# Patient Record
Sex: MALE | Race: WHITE | Employment: FULL TIME | URBAN - METROPOLITAN AREA
[De-identification: names, ages, dates, MRNs, and addresses within clinical notes are randomized per-mention and may not be internally consistent; named-entity substitution may affect disease eponyms.]

---

## 2018-11-07 VITALS
WEIGHT: 235.67 LBS | SYSTOLIC BLOOD PRESSURE: 131 MMHG | RESPIRATION RATE: 17 BRPM | BODY MASS INDEX: 30.25 KG/M2 | TEMPERATURE: 97.8 F | OXYGEN SATURATION: 99 % | DIASTOLIC BLOOD PRESSURE: 85 MMHG | HEIGHT: 74 IN | HEART RATE: 71 BPM

## 2018-11-07 ASSESSMENT — PAIN DESCRIPTION - LOCATION: LOCATION: NECK

## 2018-11-07 ASSESSMENT — PAIN DESCRIPTION - FREQUENCY: FREQUENCY: CONTINUOUS

## 2018-11-07 ASSESSMENT — PAIN DESCRIPTION - ONSET: ONSET: GRADUAL

## 2018-11-07 ASSESSMENT — PAIN DESCRIPTION - DESCRIPTORS: DESCRIPTORS: ACHING

## 2018-11-07 ASSESSMENT — PAIN DESCRIPTION - PAIN TYPE: TYPE: ACUTE PAIN

## 2018-11-07 ASSESSMENT — PAIN DESCRIPTION - PROGRESSION: CLINICAL_PROGRESSION: GRADUALLY WORSENING

## 2018-11-07 ASSESSMENT — PAIN SCALES - GENERAL: PAINLEVEL_OUTOF10: 8

## 2018-11-07 ASSESSMENT — PAIN DESCRIPTION - ORIENTATION: ORIENTATION: RIGHT;LEFT;POSTERIOR

## 2018-11-08 ENCOUNTER — HOSPITAL ENCOUNTER (EMERGENCY)
Age: 58
Discharge: HOME | End: 2018-11-08
Payer: COMMERCIAL

## 2018-11-08 DIAGNOSIS — M54.2 NECK PAIN: Primary | ICD-10-CM

## 2018-11-08 PROCEDURE — 96372 THER/PROPH/DIAG INJ SC/IM: CPT

## 2018-11-08 PROCEDURE — 99282 EMERGENCY DEPT VISIT SF MDM: CPT

## 2018-11-08 PROCEDURE — 6360000002 HC RX W HCPCS: Performed by: NURSE PRACTITIONER

## 2018-11-08 RX ORDER — CYCLOBENZAPRINE HCL 10 MG
10 TABLET ORAL 3 TIMES DAILY PRN
Qty: 20 TABLET | Refills: 0 | Status: SHIPPED | OUTPATIENT
Start: 2018-11-08 | End: 2018-11-15

## 2018-11-08 RX ORDER — KETOROLAC TROMETHAMINE 30 MG/ML
30 INJECTION, SOLUTION INTRAMUSCULAR; INTRAVENOUS ONCE
Status: COMPLETED | OUTPATIENT
Start: 2018-11-08 | End: 2018-11-08

## 2018-11-08 RX ORDER — ORPHENADRINE CITRATE 30 MG/ML
60 INJECTION INTRAMUSCULAR; INTRAVENOUS ONCE
Status: COMPLETED | OUTPATIENT
Start: 2018-11-08 | End: 2018-11-08

## 2018-11-08 RX ORDER — IBUPROFEN 800 MG/1
800 TABLET ORAL EVERY 8 HOURS PRN
Qty: 20 TABLET | Refills: 0 | Status: SHIPPED | OUTPATIENT
Start: 2018-11-08

## 2018-11-08 RX ADMIN — KETOROLAC TROMETHAMINE 30 MG: 30 INJECTION, SOLUTION INTRAMUSCULAR at 00:54

## 2018-11-08 RX ADMIN — ORPHENADRINE CITRATE 60 MG: 30 INJECTION INTRAMUSCULAR; INTRAVENOUS at 00:54

## 2018-11-08 ASSESSMENT — ENCOUNTER SYMPTOMS
BACK PAIN: 0
COLOR CHANGE: 0
SHORTNESS OF BREATH: 0
RESPIRATORY NEGATIVE: 1

## 2018-11-08 NOTE — ED PROVIDER NOTES
**EVALUATED BY ADVANCED PRACTICE PROVIDER**        [facility]  eMERGENCY dEPARTMENT eNCOUnter      Pt Name: Cody Hinton  YME:[id number]  Brodiegfradames 1960  Date of evaluation: 11/7/2018  Provider: RADHA Rivera CNP      Chief Complaint:    Chief Complaint   Patient presents with    Neck Pain     Neck pain x 36 hours. Described as stiffness that started yesterday morning, gradually became worse throughout the day. Advil helped last night, but pain returned this morning and advil has not helped since. Nursing Notes, Past Medical Hx, Past Surgical Hx, Social Hx, Allergies, and Family Hx were all reviewed and agreed with or any disagreements were addressed in the HPI.    HPI:  (Location, Duration, Timing, Severity,Quality, Assoc Sx, Context, Modifying factors)  This is a  59 y.o. male who presents to the emergency department today complaining of neck stiffness. Onset was one 2 days ago. The context was that the symptoms started spontaneously without any known injury or trauma. Pain is been constant. He describes it as stiffness and rated 8/10. Pain is exacerbated with certain movements. No headache lightheadedness or dizziness. No chest pain or shortness of breath. No fever or chills. Patient states he took Motrin which did help the pain but then he went for a jog and flared back up again. PastMedical/Surgical History:  History reviewed. No pertinent past medical history. History reviewed. No pertinent surgical history. Medications:  Previous Medications    No medications on file         Review of Systems:  Review of Systems   Constitutional: Negative for chills, diaphoresis and fever. Respiratory: Negative for shortness of breath. Cardiovascular: Negative for chest pain. Musculoskeletal: Positive for neck pain and neck stiffness. Negative for back pain. Skin: Negative for color change and rash.    Allergic/Immunologic: Negative for Non-plain film images such as CT, Ultrasound and MRI are read by the radiologist. RADHA Chan CNP have directly visualized the radiologic plain film image(s) with the below findings:        Interpretation per the Radiologist below, if available at the time of thisnote:    No orders to display        No results found. MEDICAL DECISION MAKING / ED COURSE:      PROCEDURES:   Procedures    None    Patient was given:  Medications   orphenadrine (NORFLEX) injection 60 mg (60 mg Intramuscular Given 11/8/18 0054)   ketorolac (TORADOL) injection 30 mg (30 mg Intramuscular Given 11/8/18 0054)       Differential Diagnosis: Spinal cord compression, nerve root compression, torticollis cervical discitis, osteomyelitis of the vertebral bodies, carotid dissection, spinal fracture or dislocation, Intracranial Bleed, other. Patient seen and examined today for Neck stiffness. See HPI for patient presentation. Patient is hemodynamically stable, nontoxic, afebrile, and without tachycardia, tachypnea, and hypoxia. Physical exam as well. Well-appearing 68-year-old male lying in bed in no acute distress. Bilateral paracervical tenderness worse on the left. No midline spine tenderness. No carotid bruits. Lungs are CTA bilaterally. Cardiac exam is normal.  Patient is neurovascular intact without deficits. He was given Norflex and Toradol in the emergency department. He was resting comfortably and felt better on my reexam.  I feel he is appropriate and safe for discharge home at this time. The history and physical exam suggests a soft tissue source for pain such as muscles, tendons, nerve irritation, etc. Saroj Wolf's cervical spine was clinically cleared using the NEXUS cervical spine criteria and [place] C-Spine rule. Patient is competent without evidence of intoxication or altered LOC. Patient has no focal neurologic deficits or distracting injury. No midline cervical spine tenderness on palpation.

## 2018-11-08 NOTE — ED PROVIDER NOTES
Negative. Musculoskeletal: Positive for neck pain and neck stiffness. Negative for arthralgias, back pain, gait problem, joint swelling and myalgias. Skin: Negative. Allergic/Immunologic: Negative for immunocompromised state. Neurological: Negative. All other systems reviewed and are negative. Positives and Pertinent negatives as per HPI. Except as noted abovein the ROS, all other systems were reviewed and negative. PAST MEDICAL HISTORY   History reviewed. No pertinent past medical history. SURGICAL HISTORY   History reviewed. No pertinent surgical history. CURRENTMEDICATIONS       Previous Medications    No medications on file         ALLERGIES     Patient has no known allergies. FAMILYHISTORY     History reviewed. No pertinent family history. SOCIAL HISTORY       Social History     Social History    Marital status:      Spouse name: N/A    Number of children: N/A    Years of education: N/A     Social History Main Topics    Smoking status: Never Smoker    Smokeless tobacco: Never Used    Alcohol use Yes      Comment: Occasional    Drug use: No    Sexual activity: Not Asked     Other Topics Concern    None     Social History Narrative    None       SCREENINGS             PHYSICAL EXAM    (up to 7 for level 4, 8 or more for level 5)     ED Triage Vitals [11/07/18 2333]   BP Temp Temp Source Pulse Resp SpO2 Height Weight   131/85 97.8 °F (36.6 °C) Oral 71 17 99 % 6' 2\" (1.88 m) 235 lb 10.8 oz (106.9 kg)       Physical Exam   Constitutional: He is oriented to person, place, and time. He appears well-developed and well-nourished. No distress. HENT:   Head: Normocephalic and atraumatic. Eyes: Conjunctivae are normal. Right eye exhibits no discharge. Left eye exhibits no discharge. Neck: Trachea normal and phonation normal. Neck supple. No JVD present. Muscular tenderness present. No tracheal tenderness and no spinous process tenderness present.  Decreased time of this note:    No orders to display     X-ray Foot Right Ap Lateral And Oblique    Result Date: 6/20/2012   Clinical History- Right foot pain  Comparison- None  Findings- The osseous structures are intact. There is no evidence of fracture or dislocation. No significant soft tissue abnormalities are appreciated. Impression- No acute abnormalities. Dictated on- 06/20/2012 10-31-24       Electronically Read Cynthia Watts MD      Electronically Released By- Lara Watts MD      Released Date Time- 06/20/12 1034       - Lara Watts MD  ------------------------------------------------------------------------------        PROCEDURES   Unless otherwise noted below, none     Procedures    CRITICAL CARE TIME   N/A    CONSULTS:  None      EMERGENCY DEPARTMENT COURSE and DIFFERENTIALDIAGNOSIS/MDM:   Vitals:    Vitals:    11/07/18 2333   BP: 131/85   Pulse: 71   Resp: 17   Temp: 97.8 °F (36.6 °C)   TempSrc: Oral   SpO2: 99%   Weight: 235 lb 10.8 oz (106.9 kg)   Height: 6' 2\" (1.88 m)       Patient was given thefollowing medications:  Medications   orphenadrine (NORFLEX) injection 60 mg (60 mg Intramuscular Given 11/8/18 0054)   ketorolac (TORADOL) injection 30 mg (30 mg Intramuscular Given 11/8/18 0054)       MDM: Patient evaluated per myself with supervisory nurse practitioner Stan Pearson. See HPI. See above for review of systems and physical exam for full PE. Patient is a very pleasant 41-year-old male presents to the ED today with complaints of musculoskeletal neck pain, decreased range of motion, \"stiffness. \"  Patient states that he woke up 2 days ago with decreased range of motion in the stiff neck that was painful to movement. Patient states that he has more pain trying to turn his Head to look to the left.   Patient states that briefly one night ago he took Advil PM made the pain better overnight but then last evening he went for a run which exacerbated his pain and he woke up with Again the same \"stiffness\" and decreased range of motion. Patient has no history of chronic neck or back issues. This was not preceded by any trauma or fall. Patient states that pain is 4 out of 10 at rest and increases to an 8 on 10 with movement. Patient is nontoxic in appearance and vital signs are all stable. Differential diagnoses include musculoskeletal pain from sprain or strain, degenerative disc disease, nonspecific findings. At this time I do not believe that it is appropriate for a CT scan of his cervical neck. Patient has no sensory neural deficits distal to his pain. Upon physical exam there were no step-offs or crepitus noted. At this time I feel that this is musculoskeletal in nature with signs of improvement with anti-inflammatories and worsening of symptoms with exercise. Anti-inflammatory and muscle relaxant ordered for patient here in the ER. It was discussed with patient to be discharged with these medications at home, to rest from exercise, to avoid any strenuous activity, to utilize alternating ice and heat therapies to reduce inflammation and loosen muscles. Patient states that he agrees and understands plan of care. He shouldn't drive or operate any heavy machinery as well as drink alcohol while on these medications. Patient states understanding. Patient and wife have no further questions at this time. Patient will be discharged home on these medications in stable condition with follow-up to primary care physician. Patient is in agreement with plan and has no further questions. The patient tolerated their visit well. They were seen and evaluated by the attending physician who agreed with the assessment and plan. The patient and / or thefamily were informed of the results of any tests, a time was given to answer questions, a plan was proposed and they agreed with plan. FINAL IMPRESSION      1.  Neck pain          DISPOSITION/PLAN